# Patient Record
Sex: MALE | Race: WHITE
[De-identification: names, ages, dates, MRNs, and addresses within clinical notes are randomized per-mention and may not be internally consistent; named-entity substitution may affect disease eponyms.]

---

## 2023-02-16 ENCOUNTER — DASHBOARD ENCOUNTERS (OUTPATIENT)
Age: 63
End: 2023-02-16

## 2023-02-16 ENCOUNTER — OFFICE VISIT (OUTPATIENT)
Dept: URBAN - METROPOLITAN AREA CLINIC 9 | Facility: CLINIC | Age: 63
End: 2023-02-16
Payer: COMMERCIAL

## 2023-02-16 ENCOUNTER — WEB ENCOUNTER (OUTPATIENT)
Dept: URBAN - METROPOLITAN AREA CLINIC 9 | Facility: CLINIC | Age: 63
End: 2023-02-16

## 2023-02-16 VITALS
DIASTOLIC BLOOD PRESSURE: 80 MMHG | HEIGHT: 71 IN | BODY MASS INDEX: 29.26 KG/M2 | SYSTOLIC BLOOD PRESSURE: 134 MMHG | WEIGHT: 209 LBS

## 2023-02-16 DIAGNOSIS — K21.9 GASTROESOPHAGEAL REFLUX DISEASE, UNSPECIFIED WHETHER ESOPHAGITIS PRESENT: ICD-10-CM

## 2023-02-16 DIAGNOSIS — R10.84 GENERALIZED ABDOMINAL PAIN: ICD-10-CM

## 2023-02-16 DIAGNOSIS — R19.7 DIARRHEA, UNSPECIFIED TYPE: ICD-10-CM

## 2023-02-16 PROBLEM — 235595009: Status: ACTIVE | Noted: 2023-02-16

## 2023-02-16 PROCEDURE — 99244 OFF/OP CNSLTJ NEW/EST MOD 40: CPT | Performed by: STUDENT IN AN ORGANIZED HEALTH CARE EDUCATION/TRAINING PROGRAM

## 2023-02-16 PROCEDURE — 99204 OFFICE O/P NEW MOD 45 MIN: CPT | Performed by: STUDENT IN AN ORGANIZED HEALTH CARE EDUCATION/TRAINING PROGRAM

## 2023-02-16 RX ORDER — CANAGLIFLOZIN AND METFORMIN HYDROCHLORIDE 150; 1000 MG/1; MG/1
TAKE 2 TABLETS BY MOUTH EVERY DAY TABLET, FILM COATED, EXTENDED RELEASE ORAL
Qty: 180 EACH | Refills: 2 | Status: ACTIVE | COMMUNITY

## 2023-02-16 RX ORDER — ESOMEPRAZOLE MAGNESIUM 20 MG/1
TAKE 1 CAPSULE BY MOUTH EVERY DAY CAPSULE, DELAYED RELEASE ORAL
Qty: 90 EACH | Refills: 1 | Status: ACTIVE | COMMUNITY

## 2023-02-16 RX ORDER — ATENOLOL 25 MG/1
TAKE 1 TABLET BY MOUTH TWICE A DAY TABLET ORAL
Qty: 180 EACH | Refills: 2 | Status: ACTIVE | COMMUNITY

## 2023-02-16 RX ORDER — LOSARTAN POTASSIUM 100 MG/1
TAKE 1 TABLET BY MOUTH EVERY DAY TABLET, FILM COATED ORAL
Qty: 90 EACH | Refills: 1 | Status: ACTIVE | COMMUNITY

## 2023-02-16 RX ORDER — ATORVASTATIN CALCIUM 40 MG/1
TAKE 1 TABLET BY MOUTH EVERY DAY TABLET, FILM COATED ORAL
Qty: 90 EACH | Refills: 1 | Status: ACTIVE | COMMUNITY

## 2023-02-16 RX ORDER — ESOMEPRAZOLE MAGNESIUM 20 MG/1
1 CAPSULE 1 HOUR BEFORE A MEAL CAPSULE, DELAYED RELEASE ORAL ONCE A DAY
Qty: 90 | Refills: 3 | OUTPATIENT

## 2023-02-16 NOTE — HPI-TODAY'S VISIT:
Patient has a history of DM, HTN, CKD who presents for diarrhea  GI Hx: Over the last several months would often times have diarrhea (loose stools, 3-5 BM/day, non bloody). Often times sticky. 1 bottle of water a day. Can increase water intake. Having some mid abdominal pain, bloating, gas.  Denies weight loss, fever, chills, nausea, vomiting. Denies dysphagia, reflux, UGI symptoms. Denies hematemesis, melena, hematochezia, blood per rectum.  EGD: 12/2021- Jan Noriega- [facility]- irregular Z line (bx: ), small HH, gastritis (bx: )  Colonoscopy: 12/2021-  Jan Noriega- [facility]- sigmoid diverticulosis, small IH, 3mm transverse colon polyp (bx: ), repeat in 3-5 years? No records available.  Imaging/Studies/Procedures: 12/2022- BUN 29, Creat 1.59. CMP otherwise normla. Triglycerides 273 (H).  (H) 2018- [facility] Stay

## 2023-03-09 LAB
(TTG) AB, IGA: <1
(TTG) AB, IGG: <1
ABSOLUTE BASOPHILS: 43
ABSOLUTE EOSINOPHILS: 170
ABSOLUTE LYMPHOCYTES: 1555
ABSOLUTE MONOCYTES: 533
ABSOLUTE NEUTROPHILS: 4800
BASOPHILS: 0.6
C-REACTIVE PROTEIN, QUANT: 3
EOSINOPHILS: 2.4
GLIADIN (DEAMIDATED) AB (IGA): <1
GLIADIN (DEAMIDATED) AB (IGG): <1
HEMATOCRIT: 51.8
HEMOGLOBIN: 17
IMMUNOGLOBULIN A: 121
LYMPHOCYTES: 21.9
MCH: 28
MCHC: 32.8
MCV: 85.3
MONOCYTES: 7.5
MPV: 11
NEUTROPHILS: 67.6
PLATELET COUNT: 203
RDW: 13.8
RED BLOOD CELL COUNT: 6.07
TSH: 2.61
WHITE BLOOD CELL COUNT: 7.1

## 2023-03-16 ENCOUNTER — OFFICE VISIT (OUTPATIENT)
Dept: URBAN - METROPOLITAN AREA CLINIC 9 | Facility: CLINIC | Age: 63
End: 2023-03-16

## 2023-11-30 ENCOUNTER — TELEPHONE ENCOUNTER (OUTPATIENT)
Dept: URBAN - METROPOLITAN AREA CLINIC 7 | Facility: CLINIC | Age: 63
End: 2023-11-30